# Patient Record
Sex: FEMALE | Race: WHITE | Employment: FULL TIME | URBAN - METROPOLITAN AREA
[De-identification: names, ages, dates, MRNs, and addresses within clinical notes are randomized per-mention and may not be internally consistent; named-entity substitution may affect disease eponyms.]

---

## 2018-04-09 LAB
ABSOL LYMPHOCYTES (HISTORICAL): 0.3 K/UL (ref 0.5–4)
ALBUMIN SERPL BCP-MCNC: 3.8 G/DL (ref 3–5.2)
ALP SERPL-CCNC: 197 U/L (ref 43–122)
ALT SERPL W P-5'-P-CCNC: 92 U/L (ref 9–52)
ANION GAP SERPL CALCULATED.3IONS-SCNC: 9 MMOL/L (ref 5–14)
AST SERPL W P-5'-P-CCNC: 65 U/L (ref 14–36)
BASOPHILS # BLD AUTO: 0.1 K/UL (ref 0–0.1)
BASOPHILS # BLD AUTO: 2 % (ref 0–1)
BILIRUB SERPL-MCNC: 0.6 MG/DL
BUN SERPL-MCNC: 15 MG/DL (ref 5–25)
C-REACTIVE PROTEIN (HISTORICAL): 1 MG/DL
CALCIUM SERPL-MCNC: 9.2 MG/DL (ref 8.4–10.2)
CHLORIDE SERPL-SCNC: 108 MEQ/L (ref 97–108)
CO2 SERPL-SCNC: 26 MMOL/L (ref 22–30)
CREATINE, SERUM (HISTORICAL): 0.92 MG/DL (ref 0.6–1.2)
DEPRECATED RDW RBC AUTO: 13.3 %
EGFR (HISTORICAL): >60 ML/MIN/1.73 M2
EOSINOPHIL # BLD AUTO: 0.3 K/UL (ref 0–0.4)
EOSINOPHIL NFR BLD AUTO: 6 % (ref 0–6)
ERYTHROCYTE SEDIMENTATION RATE (HISTORICAL): 35 MM (ref 1–20)
GLUCOSE FASTING (HISTORICAL): 91 MG/DL (ref 70–99)
HCT VFR BLD AUTO: 34.3 % (ref 36–46)
HGB BLD-MCNC: 11.6 G/DL (ref 12–16)
LYMPHOCYTES NFR BLD AUTO: 7 % (ref 25–45)
MCH RBC QN AUTO: 31.5 PG (ref 26–34)
MCHC RBC AUTO-ENTMCNC: 33.8 % (ref 31–36)
MCV RBC AUTO: 93 FL (ref 80–100)
MONOCYTES # BLD AUTO: 0.4 K/UL (ref 0.2–0.9)
MONOCYTES NFR BLD AUTO: 9 % (ref 1–10)
NEUTROPHILS ABS COUNT (HISTORICAL): 3.2 K/UL (ref 1.8–7.8)
NEUTS SEG NFR BLD AUTO: 76 % (ref 45–65)
PLATELET # BLD AUTO: 171 K/MCL (ref 150–450)
POTASSIUM SERPL-SCNC: 4.6 MEQ/L (ref 3.6–5)
RBC # BLD AUTO: 3.68 M/MCL (ref 4–5.2)
SODIUM SERPL-SCNC: 143 MEQ/L (ref 137–147)
TOTAL PROTEIN (HISTORICAL): 6.1 G/DL (ref 5.9–8.4)
WBC # BLD AUTO: 4.3 K/MCL (ref 4.5–11)

## 2019-06-24 ENCOUNTER — TRANSCRIBE ORDERS (OUTPATIENT)
Dept: ADMINISTRATIVE | Facility: HOSPITAL | Age: 69
End: 2019-06-24

## 2019-06-24 DIAGNOSIS — M81.0 AGE-RELATED OSTEOPOROSIS WITHOUT CURRENT PATHOLOGICAL FRACTURE: Primary | ICD-10-CM

## 2019-07-08 ENCOUNTER — HOSPITAL ENCOUNTER (OUTPATIENT)
Dept: RADIOLOGY | Facility: IMAGING CENTER | Age: 69
Discharge: HOME | End: 2019-07-08
Payer: MEDICARE

## 2019-07-08 DIAGNOSIS — M81.0 AGE-RELATED OSTEOPOROSIS WITHOUT CURRENT PATHOLOGICAL FRACTURE: ICD-10-CM

## 2019-07-08 PROCEDURE — 77080 DXA BONE DENSITY AXIAL: CPT

## 2022-07-12 ENCOUNTER — HOSPITAL ENCOUNTER (OUTPATIENT)
Dept: RADIOLOGY | Facility: IMAGING CENTER | Age: 72
Discharge: HOME | End: 2022-07-12
Payer: MEDICARE

## 2022-07-12 DIAGNOSIS — Z13.820 ENCOUNTER FOR SCREENING FOR OSTEOPOROSIS: ICD-10-CM

## 2022-07-12 DIAGNOSIS — M81.0 AGE-RELATED OSTEOPOROSIS WITHOUT CURRENT PATHOLOGICAL FRACTURE: ICD-10-CM

## 2022-07-12 PROCEDURE — 77080 DXA BONE DENSITY AXIAL: CPT

## 2024-03-28 ENCOUNTER — TELEPHONE (OUTPATIENT)
Age: 74
End: 2024-03-28

## 2024-03-28 NOTE — TELEPHONE ENCOUNTER
Patient on wait list for [facility] with Dr. Delgadillo.    Called and left message for patient regarding summer openings in her schedule.

## 2024-05-08 ENCOUNTER — TELEPHONE (OUTPATIENT)
Age: 74
End: 2024-05-08

## 2024-05-08 NOTE — TELEPHONE ENCOUNTER
Left detailed message explaining that this patient is not a patient of Dr. Traylor. She used to just have her infusions in our office last year but her rheumatologist is Dr. Acosta who is not affiliated with us.

## 2024-05-08 NOTE — TELEPHONE ENCOUNTER
Spark[facility] called stating pt has upcoming colonoscopy on 5/22/24    Asking if pt should be on Rituxan or needs to hold off prior to procedure.     Informed her that pt followed up in while as noted in chart and medication doesn't appear to be active.     She stated was going to fax forms over to be reviewed and provide instruction.    Please advise

## 2024-08-05 ENCOUNTER — OFFICE VISIT (OUTPATIENT)
Dept: PULMONOLOGY | Facility: CLINIC | Age: 74
End: 2024-08-05
Payer: MEDICARE

## 2024-08-05 ENCOUNTER — HOSPITAL ENCOUNTER (OUTPATIENT)
Dept: RADIOLOGY | Facility: HOSPITAL | Age: 74
Discharge: HOME | End: 2024-08-05
Payer: MEDICARE

## 2024-08-05 ENCOUNTER — APPOINTMENT (OUTPATIENT)
Dept: LAB | Facility: CLINIC | Age: 74
End: 2024-08-05
Payer: MEDICARE

## 2024-08-05 VITALS
TEMPERATURE: 98.4 F | DIASTOLIC BLOOD PRESSURE: 68 MMHG | OXYGEN SATURATION: 98 % | HEART RATE: 92 BPM | SYSTOLIC BLOOD PRESSURE: 142 MMHG | WEIGHT: 171 LBS

## 2024-08-05 DIAGNOSIS — R06.02 SHORTNESS OF BREATH: ICD-10-CM

## 2024-08-05 DIAGNOSIS — G93.32 COVID-19 LONG HAULER MANIFESTING CHRONIC FATIGUE: ICD-10-CM

## 2024-08-05 DIAGNOSIS — R06.02 SHORTNESS OF BREATH: Primary | ICD-10-CM

## 2024-08-05 DIAGNOSIS — R93.89 ABNORMAL CT OF THE CHEST: ICD-10-CM

## 2024-08-05 DIAGNOSIS — U09.9 COVID-19 LONG HAULER MANIFESTING CHRONIC FATIGUE: ICD-10-CM

## 2024-08-05 PROBLEM — M06.9 RHEUMATOID ARTHRITIS INVOLVING MULTIPLE SITES (HCC): Status: ACTIVE | Noted: 2024-08-05

## 2024-08-05 PROBLEM — R53.83 OTHER FATIGUE: Status: ACTIVE | Noted: 2024-08-05

## 2024-08-05 PROBLEM — Z86.16 PERSONAL HISTORY OF COVID-19: Status: ACTIVE | Noted: 2024-08-05

## 2024-08-05 LAB
BASOPHILS # BLD AUTO: 0.03 THOUSANDS/ÂΜL (ref 0–0.1)
BASOPHILS NFR BLD AUTO: 1 % (ref 0–1)
EOSINOPHIL # BLD AUTO: 0.18 THOUSAND/ÂΜL (ref 0–0.61)
EOSINOPHIL NFR BLD AUTO: 5 % (ref 0–6)
ERYTHROCYTE [DISTWIDTH] IN BLOOD BY AUTOMATED COUNT: 14.7 % (ref 11.6–15.1)
HCT VFR BLD AUTO: 30.3 % (ref 34.8–46.1)
HGB BLD-MCNC: 9.4 G/DL (ref 11.5–15.4)
IMM GRANULOCYTES # BLD AUTO: 0.01 THOUSAND/UL (ref 0–0.2)
IMM GRANULOCYTES NFR BLD AUTO: 0 % (ref 0–2)
LYMPHOCYTES # BLD AUTO: 0.29 THOUSANDS/ÂΜL (ref 0.6–4.47)
LYMPHOCYTES NFR BLD AUTO: 8 % (ref 14–44)
MCH RBC QN AUTO: 29.3 PG (ref 26.8–34.3)
MCHC RBC AUTO-ENTMCNC: 31 G/DL (ref 31.4–37.4)
MCV RBC AUTO: 94 FL (ref 82–98)
MONOCYTES # BLD AUTO: 0.31 THOUSAND/ÂΜL (ref 0.17–1.22)
MONOCYTES NFR BLD AUTO: 9 % (ref 4–12)
NEUTROPHILS # BLD AUTO: 2.69 THOUSANDS/ÂΜL (ref 1.85–7.62)
NEUTS SEG NFR BLD AUTO: 77 % (ref 43–75)
NRBC BLD AUTO-RTO: 0 /100 WBCS
PLATELET # BLD AUTO: 130 THOUSANDS/UL (ref 149–390)
PMV BLD AUTO: 9.7 FL (ref 8.9–12.7)
RBC # BLD AUTO: 3.21 MILLION/UL (ref 3.81–5.12)
TSH SERPL DL<=0.05 MIU/L-ACNC: 1.29 UIU/ML (ref 0.45–4.5)
WBC # BLD AUTO: 3.51 THOUSAND/UL (ref 4.31–10.16)

## 2024-08-05 PROCEDURE — 94010 BREATHING CAPACITY TEST: CPT | Performed by: INTERNAL MEDICINE

## 2024-08-05 PROCEDURE — 36415 COLL VENOUS BLD VENIPUNCTURE: CPT

## 2024-08-05 PROCEDURE — 85025 COMPLETE CBC W/AUTO DIFF WBC: CPT

## 2024-08-05 PROCEDURE — 71046 X-RAY EXAM CHEST 2 VIEWS: CPT

## 2024-08-05 PROCEDURE — 94618 PULMONARY STRESS TESTING: CPT | Performed by: INTERNAL MEDICINE

## 2024-08-05 PROCEDURE — 84443 ASSAY THYROID STIM HORMONE: CPT

## 2024-08-05 PROCEDURE — 99205 OFFICE O/P NEW HI 60 MIN: CPT | Performed by: INTERNAL MEDICINE

## 2024-08-05 RX ORDER — ALBUTEROL SULFATE 90 UG/1
2 AEROSOL, METERED RESPIRATORY (INHALATION) EVERY 6 HOURS PRN
COMMUNITY

## 2024-08-05 NOTE — PROGRESS NOTES
Pulmonary Consultation   Millie Hollingsworth 74 y.o. female MRN: [id number]  8/5/2024      Reason for Consultation:  shortness of breath, post COVID infection    Requested by: patient, self referral    Assessment:  Shortness of breath/fatigue  I reviewed current testing results with her - noted difficulty with spirometry performance, no desaturations on ambulation testing  Noted prior negative CT angio x 2 and symptoms not changing to suggest VTE  No current active infectious symptoms - do not suspect lingering COVID viremia and last rituximab dosing > 6 months ago  Suspect this to be related to Long-Hauler COVID symptoms  Will check repeat CXR in 4 weeks, check complete PFTs, check repeat CBC and obtain TFTs for now  Encourage trials at regular walking to increase stamina  Follow up in 6-8 weeks or sooner as needed    Recent COVID-19 infection - as above    Abnormal CT Chest - repeat CXR, consider repeat CT chest based upon findings and clinical course    Rheumatoid arthritis previously on Rituximab - would continue to hold rituximab for now given recent COVID infection    Plan:    Diagnoses and all orders for this visit:    Shortness of breath  -     POCT Oxygen Titration  -     POCT spirometry  -     CBC and differential; Future  -     TSH, 3rd generation with Free T4 reflex; Future  -     Complete PFT with post bronchodilator; Future  -     XR chest pa & lateral; Future    Abnormal CT of the chest    COVID-19 long hauler manifesting chronic fatigue  -     TSH, 3rd generation with Free T4 reflex; Future    Other orders  -     riTUXimab (RITUXAN) 500 mg/50 mL; Inject into a catheter in a vein  -     albuterol (PROVENTIL HFA,VENTOLIN HFA) 90 mcg/act inhaler; Inhale 2 puffs every 6 (six) hours as needed for wheezing        History of Present Illness   HPI:  Millie Hollingsworth is a 74 y.o. female who has history RA (previously on methotrexate, Enbrel, then Rituximab - last dosed Nov 2023), cervical cancer (post hysterectomy,  chemo/XRT) who presents for dyspnea evaluation.      She reports no history of any respiratory issues prior to COVID infection in June 2024. She reports she was treated for pneumonia by PCP but required admissio in July 2024 at [facility].   Records were reviewed and noted treated with decadron and remdesivir, she had elevated DDimer but negative CT angio. She also had noted wheezing during presentation.  She states she did not require oxygen at discharge.  She states she has returned to work but has difficulty with any activity.  She reports mild NP cough, no hemoptysis. She has general fatigue and desire to sleep. She denied SSCP or orthopnea, no LE edema. She has used albuterol HFA without relief. She denied wheezing and reported nebulized bronchodilators did not help during admission.  She has noted fever blisters as well. Denied GI symptoms, no fevers but does have night sweats, no adenopathy.    She denied any prior history of asthma, COPD, emphysema, recurrent respiratory infections, VTE, etc.      Review of Systems   Constitutional:  Positive for activity change, appetite change and fatigue. Negative for chills and fever.   HENT:  Positive for mouth sores, sore throat and trouble swallowing. Negative for ear pain, postnasal drip, rhinorrhea and sneezing.    Respiratory:  Positive for cough and shortness of breath. Negative for chest tightness and wheezing.    Cardiovascular:  Negative for chest pain, palpitations and leg swelling.   Gastrointestinal:  Negative for abdominal pain, nausea and vomiting.   Endocrine: Positive for heat intolerance. Negative for cold intolerance.   Musculoskeletal:  Positive for arthralgias. Negative for gait problem and myalgias.   Allergic/Immunologic: Positive for immunocompromised state.   Neurological:  Negative for syncope, light-headedness and headaches.   Hematological:  Negative for adenopathy.   Psychiatric/Behavioral:  Negative for sleep disturbance. The patient is  nervous/anxious.        Historical Information   Past Medical History:   Diagnosis Date    Cervical cancer (HCC)     Rheumatoid arthritis (HCC)      Past Surgical History:   Procedure Laterality Date    HYSTERECTOMY       Family History   Problem Relation Age of Onset    Diabetes Sister     Diabetes Brother        Occupational History: works [employer]     Social History: lifelong nonsmoker, no pets    Meds/Allergies     Current Outpatient Medications:     albuterol (PROVENTIL HFA,VENTOLIN HFA) 90 mcg/act inhaler, Inhale 2 puffs every 6 (six) hours as needed for wheezing, Disp: , Rfl:     riTUXimab (RITUXAN) 500 mg/50 mL, Inject into a catheter in a vein, Disp: , Rfl:   Not on File    Vitals: Blood pressure 142/68, pulse 92, temperature 98.4 °F (36.9 °C), temperature source Tympanic, weight 77.6 kg (171 lb), SpO2 98%., There is no height or weight on file to calculate BMI. Oxygen Therapy  SpO2: 98 %    Physical Exam  Physical Exam  Vitals reviewed.   Constitutional:       General: She is not in acute distress.     Appearance: Normal appearance. She is well-developed. She is obese. She is not ill-appearing, toxic-appearing or diaphoretic.   HENT:      Head: Normocephalic and atraumatic.      Right Ear: External ear normal.      Left Ear: External ear normal.      Nose: Nose normal. No congestion or rhinorrhea.      Mouth/Throat:      Mouth: Mucous membranes are moist.      Pharynx: No oropharyngeal exudate.      Comments: Healing oral lesions  Eyes:      General: No scleral icterus.        Right eye: No discharge.         Left eye: No discharge.      Conjunctiva/sclera: Conjunctivae normal.      Pupils: Pupils are equal, round, and reactive to light.   Neck:      Vascular: No JVD.      Trachea: No tracheal deviation.   Cardiovascular:      Rate and Rhythm: Normal rate and regular rhythm.      Heart sounds: Normal heart sounds. No murmur heard.     No gallop.   Pulmonary:      Effort: Pulmonary effort is  "normal. No respiratory distress.      Breath sounds: Normal breath sounds. No stridor. No wheezing, rhonchi or rales.   Abdominal:      General: Bowel sounds are normal. There is no distension.      Palpations: Abdomen is soft.      Tenderness: There is no abdominal tenderness. There is no guarding or rebound.   Musculoskeletal:         General: No deformity.      Right lower leg: No edema.      Left lower leg: No edema.   Lymphadenopathy:      Cervical: No cervical adenopathy.   Skin:     General: Skin is warm and dry.      Coloration: Skin is not jaundiced.      Findings: No erythema or rash.   Neurological:      General: No focal deficit present.      Mental Status: She is alert and oriented to person, place, and time. Mental status is at baseline.   Psychiatric:         Mood and Affect: Mood normal.         Behavior: Behavior normal.         Thought Content: Thought content normal.         Labs: I have personally reviewed pertinent lab results.  Lab Results   Component Value Date    WBC 4.3 (L) 2018    HGB 11.6 (L) 2018    HCT 34.3 (L) 2018    MCV 93 2018     2018     Lab Results   Component Value Date    CALCIUM 8.5 2024     2018    K 3.9 2024    CO2 20 (L) 2024     2024    BUN 19 2024    CREATININE 0.67 2024     No results found for: \"IGE\"  Lab Results   Component Value Date    ALT 17 2024    AST 23 2024    ALKPHOS 80 2024    BILITOT 0.6 2018       CareEverywhere Labs reviewed  7/15/2024 - WBC 4.3, Hgb 10.8, plt 122  2024 - DDimer >20    Imaging and other studies: I have personally reviewed pertinent reports.   and I have personally reviewed pertinent films in PACS  CT Angio 2024 - [facility] - no PE, patchy bilateral opacities c/w COVID infection, no sig mediastinal adenopathy, trace bilateral effusions, splenomegaly with heterogenous enhancement    Pulmonary function testin2024 - " Ratio 83%, FVC 1.76L (72%, Z - 1.82), FEV1 1.47L (80% - Z -1.12) - exp time 2 seconds - mild restrictive airflow defect secondary to shortened expiratory time    Ambulation Testing  8/5/2024 - RA ambulation SpO2 98% HR 92bpm, walked 3 min with SpO2 96% and maximal HR 120bpm    EKG, Pathology, and Other Studies: I have personally reviewed pertinent reports.    TTE 7/2024 - [facility] - EF 61-65%, grade II diastolic dysfunction, normal RV size/function    Luis Felipe Palomo, DO, FACP  [facility]    Answers submitted by the patient for this visit:  Pulmonology Questionnaire (Submitted on 8/5/2024)  Chief Complaint: Primary symptoms  Do you have difficulty breathing?: Yes  Chronicity: new  When did you first notice your symptoms?: more than 1 month ago  How often do your symptoms occur?: constantly  Since you first noticed this problem, how has it changed?: unchanged  Do you have shortness of breath that occurs with effort or exertion?: Yes  Do you have ear congestion?: Yes  Do you have heartburn?: No  Do you have fatigue?: Yes  Do you have nasal congestion?: No  Do you have shortness of breath when lying flat?: No  Do you have shortness of breath when you wake up?: Yes  Do you have sweats?: Yes  Have you experienced weight loss?: No  Which of the following makes your symptoms worse?: any activity  Which of the following makes your symptoms better?: rest

## 2024-08-05 NOTE — PATIENT INSTRUCTIONS
Start trials at regular light walking to increase stamina  Get blood work completed  Get complete PFTs completed  Get repeat CXR in 4-6 weeks  Follow up in 6-8 weeks

## 2024-08-28 ENCOUNTER — HOSPITAL ENCOUNTER (OUTPATIENT)
Dept: PULMONOLOGY | Facility: HOSPITAL | Age: 74
Discharge: HOME | End: 2024-08-28
Payer: MEDICARE

## 2024-08-28 DIAGNOSIS — R06.02 SHORTNESS OF BREATH: ICD-10-CM

## 2024-08-28 PROCEDURE — 94729 DIFFUSING CAPACITY: CPT | Performed by: INTERNAL MEDICINE

## 2024-08-28 PROCEDURE — 94726 PLETHYSMOGRAPHY LUNG VOLUMES: CPT | Performed by: INTERNAL MEDICINE

## 2024-08-28 PROCEDURE — 94060 EVALUATION OF WHEEZING: CPT | Performed by: INTERNAL MEDICINE

## 2024-08-28 PROCEDURE — 94060 EVALUATION OF WHEEZING: CPT

## 2024-08-28 PROCEDURE — 94760 N-INVAS EAR/PLS OXIMETRY 1: CPT

## 2024-08-28 PROCEDURE — 94726 PLETHYSMOGRAPHY LUNG VOLUMES: CPT

## 2024-08-28 PROCEDURE — 94729 DIFFUSING CAPACITY: CPT

## 2024-08-28 RX ORDER — ALBUTEROL SULFATE 0.83 MG/ML
2.5 SOLUTION RESPIRATORY (INHALATION) ONCE
Status: COMPLETED | OUTPATIENT
Start: 2024-08-28 | End: 2024-08-28

## 2024-08-28 RX ADMIN — ALBUTEROL SULFATE 2.5 MG: 2.5 SOLUTION RESPIRATORY (INHALATION) at 18:01
